# Patient Record
Sex: OTHER/UNKNOWN | Race: WHITE | URBAN - METROPOLITAN AREA
[De-identification: names, ages, dates, MRNs, and addresses within clinical notes are randomized per-mention and may not be internally consistent; named-entity substitution may affect disease eponyms.]

---

## 2017-01-16 ENCOUNTER — APPOINTMENT (OUTPATIENT)
Dept: URBAN - METROPOLITAN AREA CLINIC 290 | Age: 62
Setting detail: DERMATOLOGY
End: 2017-01-16

## 2017-01-16 DIAGNOSIS — L90.0 LICHEN SCLEROSUS ET ATROPHICUS: ICD-10-CM

## 2017-01-16 DIAGNOSIS — L71.8 OTHER ROSACEA: ICD-10-CM

## 2017-01-16 DIAGNOSIS — L64.8 OTHER ANDROGENIC ALOPECIA: ICD-10-CM

## 2017-01-16 PROCEDURE — 11901 INJECT SKIN LESIONS >7: CPT

## 2017-01-16 PROCEDURE — OTHER INJECTION: OTHER

## 2017-01-16 PROCEDURE — 99213 OFFICE O/P EST LOW 20 MIN: CPT | Mod: 25

## 2017-01-16 PROCEDURE — OTHER PRESCRIPTION: OTHER

## 2017-01-16 PROCEDURE — OTHER COUNSELING: OTHER

## 2017-01-16 PROCEDURE — OTHER TREATMENT REGIMEN: OTHER

## 2017-01-16 RX ORDER — TACROLIMUS 1 MG/G
OINTMENT TOPICAL BID
Qty: 60 | Refills: 2 | Status: ERX | COMMUNITY
Start: 2017-01-16

## 2017-01-16 ASSESSMENT — LOCATION SIMPLE DESCRIPTION DERM
LOCATION SIMPLE: VULVA
LOCATION SIMPLE: LEFT CHEEK
LOCATION SIMPLE: ANTERIOR SCALP
LOCATION SIMPLE: LEFT SCALP
LOCATION SIMPLE: POSTERIOR SCALP
LOCATION SIMPLE: RIGHT SCALP

## 2017-01-16 ASSESSMENT — LOCATION ZONE DERM
LOCATION ZONE: VULVA
LOCATION ZONE: FACE
LOCATION ZONE: SCALP

## 2017-01-16 ASSESSMENT — LOCATION DETAILED DESCRIPTION DERM
LOCATION DETAILED: MID-FRONTAL SCALP
LOCATION DETAILED: LEFT INFERIOR CENTRAL MALAR CHEEK
LOCATION DETAILED: LEFT CENTRAL FRONTAL SCALP
LOCATION DETAILED: RIGHT LABIA MINORA
LOCATION DETAILED: POSTERIOR MID-PARIETAL SCALP
LOCATION DETAILED: LEFT MEDIAL FRONTAL SCALP
LOCATION DETAILED: RIGHT MEDIAL FRONTAL SCALP
LOCATION DETAILED: RIGHT CENTRAL FRONTAL SCALP

## 2017-01-16 NOTE — PROCEDURE: TREATMENT REGIMEN
Detail Level: Zone
Plan: Regain 5% with minoxidil nightly \\nBiotin, zinc, and multi vitamin supplements daily.
Plan: Clobetasol cream - apply to genital area once weekly as needed for flares
Plan: Metro gel - apply a pea size amount to face once daily at night

## 2017-01-16 NOTE — PROCEDURE: INJECTION
Dose Administered (Numbers Only): 1.5
Consent: Risks of injection discussed
Dose Administered (Numbers Only): 0
Route: IL
Units: mg
Bill J-Code: yes
Units: cc
Detail Level: None
Medication (1) And Associated J-Code Units: Triamcinolone diacetate, 5mg
Administered By (Optional): Franco

## 2017-02-23 ENCOUNTER — APPOINTMENT (OUTPATIENT)
Dept: URBAN - METROPOLITAN AREA CLINIC 290 | Age: 62
Setting detail: DERMATOLOGY
End: 2017-02-23

## 2017-02-23 DIAGNOSIS — L64.8 OTHER ANDROGENIC ALOPECIA: ICD-10-CM

## 2017-02-23 DIAGNOSIS — L90.0 LICHEN SCLEROSUS ET ATROPHICUS: ICD-10-CM

## 2017-02-23 PROCEDURE — OTHER TREATMENT REGIMEN: OTHER

## 2017-02-23 PROCEDURE — 99212 OFFICE O/P EST SF 10 MIN: CPT | Mod: 25

## 2017-02-23 PROCEDURE — OTHER OTHER: OTHER

## 2017-02-23 PROCEDURE — 11901 INJECT SKIN LESIONS >7: CPT

## 2017-02-23 PROCEDURE — OTHER COUNSELING: OTHER

## 2017-02-23 PROCEDURE — OTHER INJECTION: OTHER

## 2017-02-23 ASSESSMENT — LOCATION SIMPLE DESCRIPTION DERM
LOCATION SIMPLE: POSTERIOR SCALP
LOCATION SIMPLE: INFERIOR FOREHEAD
LOCATION SIMPLE: ANTERIOR SCALP
LOCATION SIMPLE: SCALP
LOCATION SIMPLE: LEFT FOREHEAD
LOCATION SIMPLE: SUPERIOR FOREHEAD
LOCATION SIMPLE: LEFT SCALP
LOCATION SIMPLE: RIGHT SCALP
LOCATION SIMPLE: RIGHT FOREHEAD

## 2017-02-23 ASSESSMENT — LOCATION DETAILED DESCRIPTION DERM
LOCATION DETAILED: LEFT SUPERIOR FOREHEAD
LOCATION DETAILED: SUPERIOR MID FOREHEAD
LOCATION DETAILED: LEFT SUPERIOR PARIETAL SCALP
LOCATION DETAILED: MID-FRONTAL SCALP
LOCATION DETAILED: RIGHT SUPERIOR MEDIAL FOREHEAD
LOCATION DETAILED: RIGHT MEDIAL FRONTAL SCALP
LOCATION DETAILED: LEFT CENTRAL FRONTAL SCALP
LOCATION DETAILED: LEFT SUPERIOR MEDIAL FOREHEAD
LOCATION DETAILED: LEFT MEDIAL FRONTAL SCALP
LOCATION DETAILED: INFERIOR MID FOREHEAD
LOCATION DETAILED: POSTERIOR MID-PARIETAL SCALP

## 2017-02-23 ASSESSMENT — LOCATION ZONE DERM
LOCATION ZONE: FACE
LOCATION ZONE: SCALP

## 2017-02-23 NOTE — PROCEDURE: TREATMENT REGIMEN
Plan: Continue Biotin 1000mg, Zinc, and multivitamin with supplements daily
Detail Level: Zone
Plan: Clobetasol cream - apply to genital area once weekly as needed for flares

## 2017-02-23 NOTE — PROCEDURE: OTHER
Detail Level: Simple
Note Text (......Xxx Chief Complaint.): This diagnosis correlates with the
Other (Free Text): 2.5 Kenalog was injected into the scalp with the total of 12 lesions.

## 2017-02-23 NOTE — PROCEDURE: INJECTION
Medication (1) And Associated J-Code Units: Triamcinolone acetonide, 10mg
Dose Administered (Numbers Only): 0
Bill J-Code: yes
Render J-Code Information In Note?: no
Dose Administered (Numbers Only): 1.5
Administered By (Optional): Twin Gamez PA-C
Detail Level: Zone
Units: cc
Treatment Number: 2
Post-Care Instructions: I reviewed with the patient in detail post-care instructions. Patient understands to keep the injection sites clean and call the clinic if there is any redness, swelling or pain.
Route: IL
Consent: The risks of the medication was reviewed with the patient.
units

## 2017-03-23 ENCOUNTER — APPOINTMENT (OUTPATIENT)
Dept: URBAN - METROPOLITAN AREA CLINIC 290 | Age: 62
Setting detail: DERMATOLOGY
End: 2017-03-23

## 2017-03-23 DIAGNOSIS — L64.8 OTHER ANDROGENIC ALOPECIA: ICD-10-CM

## 2017-03-23 PROCEDURE — 11901 INJECT SKIN LESIONS >7: CPT

## 2017-03-23 PROCEDURE — OTHER TREATMENT REGIMEN: OTHER

## 2017-03-23 PROCEDURE — OTHER INJECTION: OTHER

## 2017-03-23 PROCEDURE — OTHER COUNSELING: OTHER

## 2017-03-23 PROCEDURE — OTHER OTHER: OTHER

## 2017-03-23 PROCEDURE — OTHER MIPS QUALITY: OTHER

## 2017-03-23 ASSESSMENT — LOCATION ZONE DERM
LOCATION ZONE: FACE
LOCATION ZONE: SCALP

## 2017-03-23 ASSESSMENT — LOCATION DETAILED DESCRIPTION DERM
LOCATION DETAILED: RIGHT MEDIAL FRONTAL SCALP
LOCATION DETAILED: INFERIOR MID FOREHEAD
LOCATION DETAILED: LEFT SUPERIOR FOREHEAD
LOCATION DETAILED: LEFT MEDIAL FRONTAL SCALP
LOCATION DETAILED: MID-FRONTAL SCALP
LOCATION DETAILED: LEFT SUPERIOR PARIETAL SCALP
LOCATION DETAILED: LEFT SUPERIOR MEDIAL FOREHEAD
LOCATION DETAILED: RIGHT SUPERIOR FOREHEAD
LOCATION DETAILED: RIGHT SUPERIOR MEDIAL FOREHEAD
LOCATION DETAILED: RIGHT CENTRAL FRONTAL SCALP
LOCATION DETAILED: LEFT CENTRAL FRONTAL SCALP
LOCATION DETAILED: SUPERIOR MID FOREHEAD
LOCATION DETAILED: POSTERIOR MID-PARIETAL SCALP
LOCATION DETAILED: RIGHT CENTRAL PARIETAL SCALP

## 2017-03-23 ASSESSMENT — LOCATION SIMPLE DESCRIPTION DERM
LOCATION SIMPLE: SCALP
LOCATION SIMPLE: RIGHT SCALP
LOCATION SIMPLE: LEFT SCALP
LOCATION SIMPLE: RIGHT FOREHEAD
LOCATION SIMPLE: INFERIOR FOREHEAD
LOCATION SIMPLE: ANTERIOR SCALP
LOCATION SIMPLE: POSTERIOR SCALP
LOCATION SIMPLE: SUPERIOR FOREHEAD
LOCATION SIMPLE: LEFT FOREHEAD

## 2017-03-23 NOTE — PROCEDURE: TREATMENT REGIMEN
Continue Regimen: Continue Biotin 1000mg, Zinc, and multivitamin with supplements daily\\nCormax 0.05% scalp solution: 1-2 times a week
Detail Level: Zone

## 2017-03-23 NOTE — PROCEDURE: INJECTION
Post-Care Instructions: I reviewed with the patient in detail post-care instructions. Patient understands to keep the injection sites clean and call the clinic if there is any redness, swelling or pain.
Route: IL
Units: cc
Bill J-Code: yes
Detail Level: Zone
Medication (1) And Associated J-Code Units: Triamcinolone acetonide, 10mg
Dose Administered (Numbers Only): 2.5
Treatment Number: 3
Administered By (Optional): Twin Gamez PA-C
Consent: The risks of the medication was reviewed with the patient.
Render J-Code Information In Note?: no
Dose Administered (Numbers Only): 0
units

## 2017-03-23 NOTE — PROCEDURE: OTHER
Detail Level: Simple
Note Text (......Xxx Chief Complaint.): This diagnosis correlates with the
Other (Free Text): Kenalog 2.5 was injected into the scalp with the total of 22 lesions.

## 2017-05-24 ENCOUNTER — APPOINTMENT (OUTPATIENT)
Dept: URBAN - METROPOLITAN AREA CLINIC 290 | Age: 62
Setting detail: DERMATOLOGY
End: 2017-05-24

## 2017-05-24 DIAGNOSIS — D22 MELANOCYTIC NEVI: ICD-10-CM

## 2017-05-24 DIAGNOSIS — L64.8 OTHER ANDROGENIC ALOPECIA: ICD-10-CM

## 2017-05-24 PROBLEM — D22.71 MELANOCYTIC NEVI OF RIGHT LOWER LIMB, INCLUDING HIP: Status: ACTIVE | Noted: 2017-05-24

## 2017-05-24 PROCEDURE — 11100: CPT

## 2017-05-24 PROCEDURE — 11901 INJECT SKIN LESIONS >7: CPT

## 2017-05-24 PROCEDURE — OTHER COUNSELING: OTHER

## 2017-05-24 PROCEDURE — OTHER BIOPSY BY SHAVE METHOD: OTHER

## 2017-05-24 PROCEDURE — OTHER TREATMENT REGIMEN: OTHER

## 2017-05-24 PROCEDURE — OTHER INTRALESIONAL KENALOG: OTHER

## 2017-05-24 ASSESSMENT — LOCATION DETAILED DESCRIPTION DERM
LOCATION DETAILED: RIGHT SUPERIOR MEDIAL FOREHEAD
LOCATION DETAILED: RIGHT SUPERIOR FOREHEAD
LOCATION DETAILED: LEFT SUPERIOR PARIETAL SCALP
LOCATION DETAILED: LEFT MEDIAL FRONTAL SCALP
LOCATION DETAILED: SUPERIOR MID FOREHEAD
LOCATION DETAILED: RIGHT MEDIAL FRONTAL SCALP
LOCATION DETAILED: RIGHT ANTERIOR PROXIMAL THIGH

## 2017-05-24 ASSESSMENT — LOCATION ZONE DERM
LOCATION ZONE: FACE
LOCATION ZONE: SCALP
LOCATION ZONE: LEG

## 2017-05-24 ASSESSMENT — LOCATION SIMPLE DESCRIPTION DERM
LOCATION SIMPLE: SUPERIOR FOREHEAD
LOCATION SIMPLE: RIGHT FOREHEAD
LOCATION SIMPLE: LEFT SCALP
LOCATION SIMPLE: RIGHT THIGH
LOCATION SIMPLE: RIGHT SCALP
LOCATION SIMPLE: SCALP

## 2017-05-24 NOTE — PROCEDURE: BIOPSY BY SHAVE METHOD
Electrodesiccation And Curettage Text: The wound bed was treated with electrodesiccation and curettage after the biopsy was performed.
Size Of Lesion In Cm: 0.6
Anesthesia Volume In Cc (Will Not Render If 0): 0.5
X Size Of Lesion In Cm: 0
Destruction After The Procedure: No
Path Notes (To The Dermatopathologist): 6 mm shave
Hemostasis: Aluminum Chloride
Cryotherapy Text: The wound bed was treated with cryotherapy after the biopsy was performed.
Dressing: waterproof dressing
Render Post-Care Instructions In Note?: yes
Wound Care: Aquaphor
Type Of Destruction Used: Curettage
Anesthesia Type: 1% lidocaine with 1:100,000 epinephrine
Biopsy Type: H and E
Detail Level: Detailed
Electrodesiccation Text: The wound bed was treated with electrodesiccation after the biopsy was performed.
Consent: Verbal consent was obtained and risks were reviewed including but not limited to scarring, infection, bleeding, scabbing, incomplete removal.
Silver Nitrate Text: The wound bed was treated with silver nitrate after the biopsy was performed.
Post-Care Instructions: I reviewed with the patient in detail post-care instructions. Patient is to keep the biopsy site dry overnight, and then apply Vaseline and clean bandage once daily until healed.  I.F.D.
Billing Type: Third-Party Bill
Biopsy Method: Dermablade

## 2017-05-24 NOTE — PROCEDURE: INTRALESIONAL KENALOG
Medical Necessity Clause: This procedure was medically necessary because the lesions that were treated were:
Consent: Warned of scarring, reoccurrence, atrophy, and pigment changes. ICG by patient.  I.FNICOLE.
Detail Level: Zone
Total Volume Injected (Ccs- Only Use Numbers And Decimals): 0.7
X Size Of Lesion In Cm (Optional): 0
Administered By (Optional): Twin Gamez PA-C
Concentration Of Solution Injected (Mg/Ml): 2.5
Include Z78.9 (Other Specified Conditions Influencing Health Status) As An Associated Diagnosis?: No
Kenalog Preparation: Kenalog

## 2017-05-24 NOTE — PROCEDURE: TREATMENT REGIMEN
Detail Level: Simple
Plan: Continue Biotin 1000mg, Zinc, and multivitamin with supplements daily\\nCormax 0.05% scalp solution: 1-2 times a week.  3 drops per application

## 2017-07-13 ENCOUNTER — APPOINTMENT (OUTPATIENT)
Dept: URBAN - METROPOLITAN AREA CLINIC 290 | Age: 62
Setting detail: DERMATOLOGY
End: 2017-07-13

## 2017-07-13 DIAGNOSIS — L64.8 OTHER ANDROGENIC ALOPECIA: ICD-10-CM

## 2017-07-13 DIAGNOSIS — L82.1 OTHER SEBORRHEIC KERATOSIS: ICD-10-CM

## 2017-07-13 PROCEDURE — OTHER INTRALESIONAL KENALOG: OTHER

## 2017-07-13 PROCEDURE — 11901 INJECT SKIN LESIONS >7: CPT

## 2017-07-13 PROCEDURE — OTHER TREATMENT REGIMEN: OTHER

## 2017-07-13 PROCEDURE — OTHER PRESCRIPTION: OTHER

## 2017-07-13 PROCEDURE — OTHER PATHOLOGY DISCUSSION: OTHER

## 2017-07-13 PROCEDURE — OTHER COUNSELING: OTHER

## 2017-07-13 RX ORDER — CLOBETASOL PROPIONATE 0.46 MG/ML
SOLUTION TOPICAL
Qty: 50 | Refills: 0 | Status: ERX

## 2017-07-13 ASSESSMENT — LOCATION SIMPLE DESCRIPTION DERM
LOCATION SIMPLE: RIGHT FOREHEAD
LOCATION SIMPLE: RIGHT THIGH
LOCATION SIMPLE: SCALP
LOCATION SIMPLE: LEFT FOREHEAD
LOCATION SIMPLE: SUPERIOR FOREHEAD
LOCATION SIMPLE: RIGHT SCALP
LOCATION SIMPLE: LEFT SCALP

## 2017-07-13 ASSESSMENT — LOCATION DETAILED DESCRIPTION DERM
LOCATION DETAILED: RIGHT MEDIAL FRONTAL SCALP
LOCATION DETAILED: LEFT SUPERIOR PARIETAL SCALP
LOCATION DETAILED: RIGHT SUPERIOR MEDIAL FOREHEAD
LOCATION DETAILED: SUPERIOR MID FOREHEAD
LOCATION DETAILED: LEFT SUPERIOR FOREHEAD
LOCATION DETAILED: RIGHT SUPERIOR FOREHEAD
LOCATION DETAILED: RIGHT ANTERIOR PROXIMAL THIGH
LOCATION DETAILED: LEFT MEDIAL FRONTAL SCALP

## 2017-07-13 ASSESSMENT — LOCATION ZONE DERM
LOCATION ZONE: SCALP
LOCATION ZONE: FACE
LOCATION ZONE: LEG

## 2017-07-13 NOTE — PROCEDURE: INTRALESIONAL KENALOG
Kenalog Preparation: Kenalog
Medical Necessity Clause: This procedure was medically necessary because the lesions that were treated were:
Concentration Of Solution Injected (Mg/Ml): 2.5
Detail Level: Zone
Include Z78.9 (Other Specified Conditions Influencing Health Status) As An Associated Diagnosis?: No
X Size Of Lesion In Cm (Optional): 0
Total Volume Injected (Ccs- Only Use Numbers And Decimals): 0.7
Administered By (Optional): Twin Gamez PA-C
Consent: Warned of scarring, reoccurrence, atrophy, and pigment changes. ICG by patient.  I.FNICOLE.

## 2017-09-06 ENCOUNTER — APPOINTMENT (OUTPATIENT)
Dept: URBAN - METROPOLITAN AREA CLINIC 290 | Age: 62
Setting detail: DERMATOLOGY
End: 2017-09-06

## 2017-09-06 DIAGNOSIS — L64.8 OTHER ANDROGENIC ALOPECIA: ICD-10-CM

## 2017-09-06 DIAGNOSIS — L57.0 ACTINIC KERATOSIS: ICD-10-CM

## 2017-09-06 PROCEDURE — 17000 DESTRUCT PREMALG LESION: CPT

## 2017-09-06 PROCEDURE — OTHER COUNSELING: OTHER

## 2017-09-06 PROCEDURE — OTHER LIQUID NITROGEN: OTHER

## 2017-09-06 PROCEDURE — OTHER INTRALESIONAL KENALOG: OTHER

## 2017-09-06 PROCEDURE — OTHER TREATMENT REGIMEN: OTHER

## 2017-09-06 PROCEDURE — 11901 INJECT SKIN LESIONS >7: CPT | Mod: 59

## 2017-09-06 ASSESSMENT — LOCATION DETAILED DESCRIPTION DERM
LOCATION DETAILED: LEFT SUPERIOR MEDIAL FOREHEAD
LOCATION DETAILED: NASAL DORSUM
LOCATION DETAILED: LEFT MEDIAL FRONTAL SCALP
LOCATION DETAILED: LEFT SUPERIOR PARIETAL SCALP
LOCATION DETAILED: MID-FRONTAL SCALP

## 2017-09-06 ASSESSMENT — LOCATION SIMPLE DESCRIPTION DERM
LOCATION SIMPLE: ANTERIOR SCALP
LOCATION SIMPLE: SCALP
LOCATION SIMPLE: LEFT SCALP
LOCATION SIMPLE: NOSE
LOCATION SIMPLE: LEFT FOREHEAD

## 2017-09-06 ASSESSMENT — LOCATION ZONE DERM
LOCATION ZONE: FACE
LOCATION ZONE: SCALP
LOCATION ZONE: NOSE

## 2017-09-06 NOTE — PROCEDURE: TREATMENT REGIMEN
Detail Level: Simple
Plan: Continue Biotin 1000mg, Zinc, and multivitamin with supplements daily.\\nContinue Cormax scalp solution twice weekly

## 2017-09-06 NOTE — PROCEDURE: INTRALESIONAL KENALOG
Consent: Warned of scarring, reoccurrence, atrophy, and pigment changes. ICG by patient.  I.FNICOLE.
Concentration Of Solution Injected (Mg/Ml): 5.0
X Size Of Lesion In Cm (Optional): 0
Detail Level: Detailed
Administered By (Optional): Twin Gamez PA-C
Kenalog Preparation: Kenalog
Total Volume Injected (Ccs- Only Use Numbers And Decimals): 1.5
Include Z78.9 (Other Specified Conditions Influencing Health Status) As An Associated Diagnosis?: No
Medical Necessity Clause: This procedure was medically necessary because the lesions that were treated were:

## 2017-12-05 ENCOUNTER — APPOINTMENT (OUTPATIENT)
Dept: URBAN - METROPOLITAN AREA CLINIC 290 | Age: 62
Setting detail: DERMATOLOGY
End: 2017-12-05

## 2017-12-05 DIAGNOSIS — L57.0 ACTINIC KERATOSIS: ICD-10-CM

## 2017-12-05 DIAGNOSIS — L64.8 OTHER ANDROGENIC ALOPECIA: ICD-10-CM

## 2017-12-05 PROCEDURE — OTHER COUNSELING: OTHER

## 2017-12-05 PROCEDURE — OTHER TREATMENT REGIMEN: OTHER

## 2017-12-05 PROCEDURE — OTHER PRESCRIPTION: OTHER

## 2017-12-05 PROCEDURE — OTHER MIPS QUALITY: OTHER

## 2017-12-05 PROCEDURE — 99213 OFFICE O/P EST LOW 20 MIN: CPT

## 2017-12-05 RX ORDER — FLUOCINOLONE ACETONIDE 0.01 %
KIT TOPICAL
Qty: 120 | Refills: 1 | Status: ERX | COMMUNITY
Start: 2017-12-05

## 2017-12-05 ASSESSMENT — LOCATION SIMPLE DESCRIPTION DERM: LOCATION SIMPLE: SCALP

## 2017-12-05 ASSESSMENT — LOCATION ZONE DERM: LOCATION ZONE: SCALP

## 2017-12-05 ASSESSMENT — LOCATION DETAILED DESCRIPTION DERM: LOCATION DETAILED: LEFT SUPERIOR PARIETAL SCALP

## 2017-12-05 NOTE — PROCEDURE: TREATMENT REGIMEN
Plan: Continue Biotin 1000mg, Zinc, and multivitamin with supplements daily. Patient has purchased a hair piece and wants to hold off on injections at this time.
Discontinue Regimen: Cormax scalp solution twice weekly
Detail Level: Simple

## 2022-10-26 NOTE — PROCEDURE: LIQUID NITROGEN
no
Render Post-Care Instructions In Note?: no
Detail Level: Zone
Duration Of Freeze Thaw-Cycle (Seconds): 0
Consent: Patient warned of the risk of swelling, blistering, and scarring. I.F.D.